# Patient Record
Sex: FEMALE | Race: WHITE | NOT HISPANIC OR LATINO | Employment: UNEMPLOYED | URBAN - METROPOLITAN AREA
[De-identification: names, ages, dates, MRNs, and addresses within clinical notes are randomized per-mention and may not be internally consistent; named-entity substitution may affect disease eponyms.]

---

## 2017-05-25 ENCOUNTER — OFFICE VISIT (OUTPATIENT)
Dept: ENDOCRINOLOGY | Age: 22
End: 2017-05-25

## 2017-05-25 VITALS
SYSTOLIC BLOOD PRESSURE: 114 MMHG | HEART RATE: 74 BPM | OXYGEN SATURATION: 98 % | BODY MASS INDEX: 21.97 KG/M2 | WEIGHT: 109 LBS | HEIGHT: 59 IN | DIASTOLIC BLOOD PRESSURE: 68 MMHG

## 2017-05-25 DIAGNOSIS — R63.5 ABNORMAL WEIGHT GAIN: ICD-10-CM

## 2017-05-25 DIAGNOSIS — E06.9 THYROIDITIS: ICD-10-CM

## 2017-05-25 DIAGNOSIS — R53.82 CHRONIC FATIGUE: ICD-10-CM

## 2017-05-25 DIAGNOSIS — E03.9 HYPOTHYROIDISM, UNSPECIFIED TYPE: Primary | ICD-10-CM

## 2017-05-25 PROCEDURE — 99205 OFFICE O/P NEW HI 60 MIN: CPT | Performed by: INTERNAL MEDICINE

## 2017-05-25 RX ORDER — OLANZAPINE 10 MG/1
TABLET ORAL
Refills: 0 | COMMUNITY
Start: 2017-05-23 | End: 2017-07-06

## 2017-05-25 RX ORDER — FLUOXETINE 20 MG/1
TABLET, FILM COATED ORAL
Refills: 0 | COMMUNITY
Start: 2017-05-23 | End: 2017-07-06

## 2017-05-25 RX ORDER — DIVALPROEX SODIUM 500 MG/1
TABLET, EXTENDED RELEASE ORAL
Refills: 0 | COMMUNITY
Start: 2017-05-17

## 2017-05-26 DIAGNOSIS — R53.82 CHRONIC FATIGUE: ICD-10-CM

## 2017-05-26 DIAGNOSIS — E03.9 HYPOTHYROIDISM, UNSPECIFIED TYPE: Primary | ICD-10-CM

## 2017-05-26 PROBLEM — E06.9 THYROIDITIS: Status: ACTIVE | Noted: 2017-05-26

## 2017-05-26 PROBLEM — R63.5 ABNORMAL WEIGHT GAIN: Status: ACTIVE | Noted: 2017-05-26

## 2017-05-26 LAB
T3 SERPL-MCNC: 85.3 NG/DL (ref 80–200)
T4 FREE SERPL-MCNC: 1.29 NG/DL (ref 0.93–1.7)
THYROPEROXIDASE AB SERPL-ACNC: 9 IU/ML (ref 0–34)
TSH SERPL DL<=0.005 MIU/L-ACNC: 1.78 MIU/ML (ref 0.27–4.2)

## 2017-05-26 RX ORDER — LIOTHYRONINE SODIUM 5 UG/1
5 TABLET ORAL
Qty: 30 TABLET | Refills: 1 | Status: SHIPPED | OUTPATIENT
Start: 2017-05-26 | End: 2017-07-17 | Stop reason: SDUPTHER

## 2017-06-07 DIAGNOSIS — E03.9 HYPOTHYROIDISM, UNSPECIFIED TYPE: Primary | ICD-10-CM

## 2017-06-07 DIAGNOSIS — R53.82 CHRONIC FATIGUE: ICD-10-CM

## 2017-06-12 ENCOUNTER — RESULTS ENCOUNTER (OUTPATIENT)
Dept: ENDOCRINOLOGY | Age: 22
End: 2017-06-12

## 2017-06-12 DIAGNOSIS — R53.82 CHRONIC FATIGUE: ICD-10-CM

## 2017-06-12 DIAGNOSIS — E03.9 HYPOTHYROIDISM, UNSPECIFIED TYPE: ICD-10-CM

## 2017-07-06 ENCOUNTER — OFFICE VISIT (OUTPATIENT)
Dept: ENDOCRINOLOGY | Age: 22
End: 2017-07-06

## 2017-07-06 VITALS
DIASTOLIC BLOOD PRESSURE: 78 MMHG | BODY MASS INDEX: 22.09 KG/M2 | HEIGHT: 59 IN | WEIGHT: 109.6 LBS | HEART RATE: 76 BPM | OXYGEN SATURATION: 98 % | SYSTOLIC BLOOD PRESSURE: 100 MMHG

## 2017-07-06 DIAGNOSIS — R53.82 CHRONIC FATIGUE: ICD-10-CM

## 2017-07-06 DIAGNOSIS — R63.5 ABNORMAL WEIGHT GAIN: ICD-10-CM

## 2017-07-06 DIAGNOSIS — E03.9 HYPOTHYROIDISM, UNSPECIFIED TYPE: Primary | ICD-10-CM

## 2017-07-06 DIAGNOSIS — E06.9 THYROIDITIS: ICD-10-CM

## 2017-07-06 LAB
T3 SERPL-MCNC: 73.1 NG/DL (ref 80–200)
T4 FREE SERPL-MCNC: 1.17 NG/DL (ref 0.93–1.7)
TSH SERPL DL<=0.005 MIU/L-ACNC: 4.95 MIU/ML (ref 0.27–4.2)

## 2017-07-06 PROCEDURE — 99214 OFFICE O/P EST MOD 30 MIN: CPT | Performed by: INTERNAL MEDICINE

## 2017-07-06 NOTE — PROGRESS NOTES
21 y.o.    Patient Care Team:  Chidi Brand MD as PCP - General (Family Medicine)    Chief Complaint:      F/U HYPOTHYROIDISM.    Subjective     HPI patient is a 21-year-old female of [place] origin came for follow-up of hypothyroidism    In the previous visit patient was placed on Cytomel for 1 month only    patient managed to discontinue the medication  She reports that she does not feel any different not taking the medication.  She's been feeling much better for the past one month  She reported that she stopped olanzapine recently  She gained nearly 16 pounds since January  Since stopping the medication olanzapine she's trending down on her weight  Patient started exercising.  She's feeling better in general and is going back to work.  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  Patient is currently not on medication/replacement     Patient reports symptoms of fatigue and excessive daytime sleepiness, cold intolerance, hair loss, irregular menstrual cycles and 8 pound weight gain in the past 2 months     Other significant history includes diagnoses of bipolar or less in January 2017 when she apparently was in a manic episode for one week subsequently several medications were tried and patient is currently on olanzapine and Depakote.  She used to be on Lamictal which was stopped approximately 1 week ago due to severe anxiety  Patient denies any difficulty swallowing or pain in the neck.  She also denies any change in her voice  Patient is  "not previously known to have any thyroid problem  The following portions of the patient's history were reviewed and updated as appropriate: allergies, current medications, past family history, past medical history, past social history, past surgical history and problem list.    Past Medical History:   Diagnosis Date   • Hypothyroidism      Family History   Problem Relation Age of Onset   • Diabetes Mother      Social History     Social History   • Marital status: Unknown     Spouse name: N/A   • Number of children: N/A   • Years of education: N/A     Occupational History   • Not on file.     Social History Main Topics   • Smoking status: Never Smoker   • Smokeless tobacco: Not on file   • Alcohol use No   • Drug use: No   • Sexual activity: Not on file     Other Topics Concern   • Not on file     Social History Narrative     No Known Allergies    Current Outpatient Prescriptions:   •  divalproex (DEPAKOTE) 500 MG 24 hr tablet, take 2 tablets by mouth twice a day, Disp: , Rfl: 0  •  FLUoxetine (PROzac) 20 MG tablet, take 1 tablet by mouth once daily, Disp: , Rfl: 0  •  liothyronine (CYTOMEL) 5 MCG tablet, Take 1 tablet by mouth Every Morning Before Breakfast., Disp: 30 tablet, Rfl: 1  •  OLANZapine (zyPREXA) 10 MG tablet, take 1 tablet by mouth at bedtime, Disp: , Rfl: 0        Review of Systems   Constitutional: Negative for chills, fatigue and fever.   Cardiovascular: Negative for chest pain and palpitations.   Gastrointestinal: Negative for abdominal pain, constipation, diarrhea, nausea and vomiting.   Endocrine: Negative for cold intolerance and heat intolerance.   All other systems reviewed and are negative.      Objective       Vitals:    07/06/17 1015   BP: 100/78   Pulse: 76   SpO2: 98%   Weight: 109 lb 9.6 oz (49.7 kg)   Height: 59\" (149.9 cm)     Body mass index is 22.14 kg/(m^2).      Physical Exam   Constitutional: She is oriented to person, place, and time. She appears well-developed and well-nourished. "   HENT:   Head: Normocephalic and atraumatic.   Eyes: EOM are normal. Pupils are equal, round, and reactive to light.   Neck: Normal range of motion. Neck supple.   Cardiovascular: Normal rate, regular rhythm, normal heart sounds and intact distal pulses.    Pulmonary/Chest: Effort normal and breath sounds normal.   Abdominal: Soft. Bowel sounds are normal. She exhibits no distension. There is no tenderness.   Musculoskeletal: Normal range of motion. She exhibits no edema.   Neurological: She is alert and oriented to person, place, and time. She has normal reflexes.   Skin: Skin is warm and dry. No rash noted.   Psychiatric: She has a normal mood and affect. Her behavior is normal.   Nursing note and vitals reviewed.    Results Review:     I reviewed the patient's new clinical results.    Medical records reviewed  Summary:      Office Visit on 05/25/2017   Component Date Value Ref Range Status   • Free T4 05/25/2017 1.29  0.93 - 1.70 ng/dL Final   • T3, Total 05/25/2017 85.3  80.0 - 200.0 ng/dL Final   • Thyroid Peroxidase Antibody 05/25/2017 9  0 - 34 IU/mL Final   • TSH 05/25/2017 1.780  0.270 - 4.200 mIU/mL Final     No results found for: HGBA1C  No results found for: GLUF, MICROALBUR, LDLCALC, CREATININE  Imaging Results (most recent)     None                Assessment and Plan:    Patricio was seen today for hypothyroidism.    Diagnoses and all orders for this visit:    Hypothyroidism, unspecified type  -     T3  -     T4, Free  -     TSH    Thyroiditis  -     T3  -     T4, Free  -     TSH    Chronic fatigue  -     T3  -     T4, Free  -     TSH    Abnormal weight gain  -     T3  -     T4, Free  -     TSH         Patient reported that she is feeling much better since stopping olanzapine  She's currently exercising.  Able to eat well and sleeping well  She is also working locally in [place] and she likes the job.  Her job is from 9 AM to 5 PM  Patient tried Cytomel as recommended for 1 month 5 µg daily in the  morning  She cannot recall if the Cytomel made her feel better but she had no side effects    She stopped taking Cytomel June 26, 2017.  Since then she has not seen any negative symptoms    she continues to feel better    Most likely she does not need this medication    I will get lab testing done today  Patient will be notified after the results have reviewed    If the lab reports are normal she may not need to follow-up with me other than when necessary     The total time spent for old record and lab review and face- to- face was more than 25 min of which greater than 15 min of time was spent on counseling the patient on recommended evaluation and treatment options, instructions for management/treatment and /or follow up  and importance of compliance with chosen management or treatment options  You Pulliam MD. FACE    07/06/17

## 2017-07-17 RX ORDER — LIOTHYRONINE SODIUM 5 UG/1
TABLET ORAL
Qty: 30 TABLET | Refills: 1 | Status: SHIPPED | OUTPATIENT
Start: 2017-07-17 | End: 2017-08-17

## 2017-08-17 ENCOUNTER — OFFICE VISIT (OUTPATIENT)
Dept: ENDOCRINOLOGY | Age: 22
End: 2017-08-17

## 2017-08-17 VITALS
WEIGHT: 110.4 LBS | SYSTOLIC BLOOD PRESSURE: 100 MMHG | OXYGEN SATURATION: 97 % | HEIGHT: 58 IN | BODY MASS INDEX: 23.18 KG/M2 | DIASTOLIC BLOOD PRESSURE: 62 MMHG | HEART RATE: 66 BPM

## 2017-08-17 DIAGNOSIS — E03.9 HYPOTHYROIDISM, UNSPECIFIED TYPE: Primary | ICD-10-CM

## 2017-08-17 DIAGNOSIS — E06.9 THYROIDITIS: ICD-10-CM

## 2017-08-17 DIAGNOSIS — R53.82 CHRONIC FATIGUE: ICD-10-CM

## 2017-08-17 PROCEDURE — 99214 OFFICE O/P EST MOD 30 MIN: CPT | Performed by: INTERNAL MEDICINE

## 2017-08-17 NOTE — PROGRESS NOTES
21 y.o.    Patient Care Team:  Chidi Brand MD as PCP - General (Family Medicine)    Chief Complaint:      F/U HYPOTHYROIDISM.  LAST LABS 7/6/17  Subjective     HPI     Patient is a 21-year-old female of  origin came for followup of hypothyroidism.  In the previous visit. Patient was placed on Cytomel for one month. Extent. She subsequently  discontinued. The medication. Patient reported that she did not make her feel any better.    She's currently taking Depakote from a psychiatrist.  She has not had any blood testing done recently.  Patient's weight is stable. At 110 pounds at a BMI of 23.1.  Patient started exercising on a regular basis.  She denies any cold or heat intolerance.          Interval History       In the previous visit patient was placed on Cytomel for 1 month only    patient managed to discontinue the medication  She reports that she does not feel any different not taking the medication.  She's been feeling much better for the past one month  She reported that she stopped olanzapine recently  She gained nearly 16 pounds since January  Since stopping the medication olanzapine she's trending down on her weight  Patient started exercising.  She's feeling better in general and is going back to work.  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  Patient is currently not on medication/replacement      Patient reports symptoms of fatigue and excessive daytime sleepiness, cold intolerance, hair loss, irregular menstrual  cycles and 8 pound weight gain in the past 2 months      Other significant history includes diagnoses of bipolar or less in January 2017 when she apparently was in a manic episode for one week subsequently several medications were tried and patient is currently on olanzapine and Depakote.  She used to be on Lamictal which was stopped approximately 1 week ago due to severe anxiety  Patient denies any difficulty swallowing or pain in the neck.  She also denies any change in her voice  Patient is not previously known to have any thyroid problem  The following portions of the patient's history were reviewed and updated as appropriate: allergies, current medications, past family history, past medical history, past social history, past surgical history and problem list.    Past Medical History:   Diagnosis Date   • Hypothyroidism      Family History   Problem Relation Age of Onset   • Diabetes Mother      Social History     Social History   • Marital status: Unknown     Spouse name: N/A   • Number of children: N/A   • Years of education: N/A     Occupational History   • Not on file.     Social History Main Topics   • Smoking status: Never Smoker   • Smokeless tobacco: Not on file   • Alcohol use No   • Drug use: No   • Sexual activity: Not on file     Other Topics Concern   • Not on file     Social History Narrative     No Known Allergies    Current Outpatient Prescriptions:   •  divalproex (DEPAKOTE) 500 MG 24 hr tablet, take 2 tablets by mouth twice a day, Disp: , Rfl: 0  •  Levonorgestrel 13.5 MG intrauterine device IUD, 13.5 mg by Intrauterine route., Disp: , Rfl:         Review of Systems   Constitutional: Positive for fatigue. Negative for chills and fever.   Cardiovascular: Negative for chest pain and palpitations.   Gastrointestinal: Negative for abdominal pain, constipation, diarrhea, nausea and vomiting.   Endocrine: Negative for cold intolerance and heat intolerance.   All other systems reviewed and are  "negative.      Objective       Vitals:    08/17/17 1113   BP: 100/62   Pulse: 66   SpO2: 97%   Weight: 110 lb 6.4 oz (50.1 kg)   Height: 58\" (147.3 cm)     Body mass index is 23.07 kg/(m^2).      Physical Exam   Constitutional: She is oriented to person, place, and time. She appears well-developed and well-nourished.   HENT:   Head: Normocephalic and atraumatic.   Eyes: EOM are normal. Pupils are equal, round, and reactive to light.   Neck: Normal range of motion. Neck supple.   Cardiovascular: Normal rate, regular rhythm, normal heart sounds and intact distal pulses.    Pulmonary/Chest: Effort normal and breath sounds normal.   Abdominal: Soft. Bowel sounds are normal. She exhibits no distension. There is no tenderness.   Musculoskeletal: Normal range of motion. She exhibits no edema.   Neurological: She is alert and oriented to person, place, and time. She has normal reflexes.   Skin: Skin is warm and dry. No rash noted.   Psychiatric: She has a normal mood and affect. Her behavior is normal.   Nursing note and vitals reviewed.    Results Review:     I reviewed the patient's new clinical results.    Medical records reviewed  Summary:      Office Visit on 07/06/2017   Component Date Value Ref Range Status   • T3, Total 07/06/2017 73.1* 80.0 - 200.0 ng/dL Final   • Free T4 07/06/2017 1.17  0.93 - 1.70 ng/dL Final   • TSH 07/06/2017 4.950* 0.270 - 4.200 mIU/mL Final     No results found for: HGBA1C  No results found for: GLUF, MICROALBUR, LDLCALC, CREATININE  Imaging Results (most recent)     None          Assessment and Plan:    Patricio was seen today for hypothyroidism.    Diagnoses and all orders for this visit:    Hypothyroidism, unspecified type  -     T4, Free  -     TSH  -     T3  -     Valproic Acid Level, Total  -     Comprehensive Metabolic Panel  -     CBC & Differential    Thyroiditis  -     T4, Free  -     TSH  -     T3  -     Valproic Acid Level, Total  -     Comprehensive Metabolic Panel  -     CBC & " "Differential    Chronic fatigue  -     T4, Free  -     TSH  -     T3  -     Valproic Acid Level, Total  -     Comprehensive Metabolic Panel  -     CBC & Differential    Patient reports feeling slightly better without the medication.  She stopped taking Cytomel almost 6 weeks ago.  She is working now and is able to continue her work without feeling excessively fatigued.  Patient is also not clear in her Depakote is causing the fatigue.  She's taking a rather high dose of 500 mg  4 tablets daily  She wants the Depakote levels checked.  She will also get CBC and CMP.  I will also check T3, free T4 and TSH levels today.    Patient's last TSH was slightly elevated at 4.8.  If the patient's thyroid levels are within the normal range. Then she may not need any further treatment.  Patient verbalized understanding.    No followup planned at this time    You Pulliam MD. FACE    08/17/17      JULIO CESAR Haley / transcription disclaimer:     \"Dictated utilizing Dragon dictation\".         "

## 2017-08-18 LAB
ALBUMIN SERPL-MCNC: 3.8 G/DL (ref 3.5–5.5)
ALBUMIN/GLOB SERPL: 1.7 {RATIO} (ref 1.2–2.2)
ALP SERPL-CCNC: 42 IU/L (ref 39–117)
ALT SERPL-CCNC: 45 IU/L (ref 0–32)
AST SERPL-CCNC: 41 IU/L (ref 0–40)
BASOPHILS # BLD AUTO: 0 X10E3/UL (ref 0–0.2)
BASOPHILS NFR BLD AUTO: 1 %
BILIRUB SERPL-MCNC: 0.4 MG/DL (ref 0–1.2)
BUN SERPL-MCNC: 14 MG/DL (ref 6–20)
BUN/CREAT SERPL: 19 (ref 9–23)
CALCIUM SERPL-MCNC: 9 MG/DL (ref 8.7–10.2)
CHLORIDE SERPL-SCNC: 100 MMOL/L (ref 96–106)
CO2 SERPL-SCNC: 25 MMOL/L (ref 18–29)
CREAT SERPL-MCNC: 0.72 MG/DL (ref 0.57–1)
EOSINOPHIL # BLD AUTO: 0 X10E3/UL (ref 0–0.4)
EOSINOPHIL NFR BLD AUTO: 0 %
ERYTHROCYTE [DISTWIDTH] IN BLOOD BY AUTOMATED COUNT: 16 % (ref 12.3–15.4)
GLOBULIN SER CALC-MCNC: 2.2 G/DL (ref 1.5–4.5)
GLUCOSE SERPL-MCNC: 80 MG/DL (ref 65–99)
HCT VFR BLD AUTO: 39.4 % (ref 34–46.6)
HGB BLD-MCNC: 13.4 G/DL (ref 11.1–15.9)
IMM GRANULOCYTES # BLD: 0 X10E3/UL (ref 0–0.1)
IMM GRANULOCYTES NFR BLD: 0 %
LYMPHOCYTES # BLD AUTO: 1.7 X10E3/UL (ref 0.7–3.1)
LYMPHOCYTES NFR BLD AUTO: 45 %
MCH RBC QN AUTO: 29.9 PG (ref 26.6–33)
MCHC RBC AUTO-ENTMCNC: 34 G/DL (ref 31.5–35.7)
MCV RBC AUTO: 88 FL (ref 79–97)
MONOCYTES # BLD AUTO: 0.5 X10E3/UL (ref 0.1–0.9)
MONOCYTES NFR BLD AUTO: 14 %
MORPHOLOGY BLD-IMP: (no result)
NEUTROPHILS # BLD AUTO: 1.5 X10E3/UL (ref 1.4–7)
NEUTROPHILS NFR BLD AUTO: 40 %
PLATELET # BLD AUTO: 92 X10E3/UL (ref 150–379)
POTASSIUM SERPL-SCNC: 4.4 MMOL/L (ref 3.5–5.2)
PROT SERPL-MCNC: 6 G/DL (ref 6–8.5)
RBC # BLD AUTO: 4.48 X10E6/UL (ref 3.77–5.28)
SODIUM SERPL-SCNC: 139 MMOL/L (ref 134–144)
T3 SERPL-MCNC: 67 NG/DL (ref 71–180)
T4 FREE SERPL-MCNC: 1.18 NG/DL (ref 0.82–1.77)
TSH SERPL DL<=0.005 MIU/L-ACNC: 5.04 UIU/ML (ref 0.45–4.5)
VALPROATE SERPL-MCNC: 143 UG/ML (ref 50–100)
WBC # BLD AUTO: 3.7 X10E3/UL (ref 3.4–10.8)

## 2017-08-29 RX ORDER — LEVOTHYROXINE SODIUM 0.03 MG/1
25 TABLET ORAL DAILY
Qty: 30 TABLET | Refills: 4 | Status: SHIPPED | OUTPATIENT
Start: 2017-08-29 | End: 2018-01-04 | Stop reason: SDUPTHER

## 2017-11-10 ENCOUNTER — OFFICE VISIT (OUTPATIENT)
Dept: ENDOCRINOLOGY | Age: 22
End: 2017-11-10

## 2017-11-10 VITALS
HEART RATE: 76 BPM | BODY MASS INDEX: 21.57 KG/M2 | HEIGHT: 59 IN | WEIGHT: 107 LBS | SYSTOLIC BLOOD PRESSURE: 106 MMHG | DIASTOLIC BLOOD PRESSURE: 64 MMHG

## 2017-11-10 DIAGNOSIS — R63.5 ABNORMAL WEIGHT GAIN: ICD-10-CM

## 2017-11-10 DIAGNOSIS — R53.82 CHRONIC FATIGUE: ICD-10-CM

## 2017-11-10 DIAGNOSIS — E03.9 HYPOTHYROIDISM, UNSPECIFIED TYPE: Primary | ICD-10-CM

## 2017-11-10 DIAGNOSIS — E06.9 THYROIDITIS: ICD-10-CM

## 2017-11-10 PROCEDURE — 99214 OFFICE O/P EST MOD 30 MIN: CPT | Performed by: INTERNAL MEDICINE

## 2017-11-10 RX ORDER — TOPIRAMATE 50 MG/1
TABLET, FILM COATED ORAL
Refills: 0 | COMMUNITY
Start: 2017-10-16 | End: 2018-05-18

## 2017-11-10 NOTE — PROGRESS NOTES
21 y.o.    Patient Care Team:  Chidi Brand MD as PCP - General (Family Medicine)    Chief Complaint:      F/U HYPOTHYROIDISM.  NO RECENT LABS.  Subjective     HPI   Patient is a 21-year-old female of [place] origin came for follow-up of hypothyroidism  After the lab tests in the previous visit patient was advised to start levothyroxine 25 µg daily.  She reported compliance with the medication since then  She denied any side effects  No palpitations or sweating as reported with Cytomel in the past  She is feeling slightly better but believes that her psychiatric medication is changed  She is taking lower dose of Depakote and apparently the plan is to discontinue it at some point in the near future  She continues to be on birth control medication    Patient also lost a few pounds since last visit and she believes that from exercise and diet control    Interval History      In the previous visit. Patient was placed on Cytomel for one month. Extent. She subsequently  discontinued. The medication. Patient reported that she did not make her feel any better.     She's currently taking Depakote from a psychiatrist.  She has not had any blood testing done recently.  Patient's weight is stable. At 110 pounds at a BMI of 23.1.  Patient started exercising on a regular basis.  She denies any cold or heat intolerance.      Interval History       In the previous visit patient was placed on Cytomel for 1 month only    patient managed to discontinue the medication  She reports that she does not feel any different not taking the medication.  She's been feeling much better for the past one month  She reported that she stopped olanzapine recently  She gained nearly 16 pounds since January  Since stopping the medication olanzapine she's trending down on her weight  Patient started exercising.  She's feeling better in general and is going back to work.  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  Patient is currently not on medication/replacement      Patient reports symptoms of fatigue and excessive daytime sleepiness, cold intolerance, hair loss, irregular menstrual cycles and 8 pound weight gain in the past 2 months      Other significant history includes diagnoses of bipolar or less in January 2017 when she apparently was in a manic episode for one week subsequently several medications were tried and patient is currently on olanzapine and Depakote.  She used to be on Lamictal which was stopped approximately 1 week ago due to severe anxiety  Patient denies any difficulty swallowing or pain in the neck.  She also denies any change in her voice  Patient is not previously known to have any thyroid problem  The following portions of the patient's history were reviewed and updated as appropriate: allergies, current medications, past family history, past medical history, past social history, past surgical history and problem list.    Past Medical History:   Diagnosis Date   • Hypothyroidism      Family History   Problem Relation Age of Onset   • Diabetes Mother      Social History     Social History   • Marital status: Unknown     Spouse name: N/A   • Number of children: N/A   • Years of education: N/A     Occupational History   • Not on file.     Social History Main Topics   • Smoking status: Never Smoker   • Smokeless tobacco: Not on file   • Alcohol use No   • Drug use: No   • Sexual activity: Not on file     Other Topics Concern   • Not on file     Social History  "Narrative     No Known Allergies    Current Outpatient Prescriptions:   •  divalproex (DEPAKOTE) 500 MG 24 hr tablet, take 2 tablets by mouth twice a day, Disp: , Rfl: 0  •  Levonorgestrel 13.5 MG intrauterine device IUD, 13.5 mg by Intrauterine route., Disp: , Rfl:   •  levothyroxine (SYNTHROID, LEVOTHROID) 25 MCG tablet, Take 1 tablet by mouth Daily., Disp: 30 tablet, Rfl: 4        Review of Systems   Constitutional: Negative for chills, fatigue and fever.   Cardiovascular: Negative for chest pain and palpitations.   Gastrointestinal: Negative for abdominal pain, constipation, diarrhea, nausea and vomiting.   Endocrine: Negative for cold intolerance and heat intolerance.   All other systems reviewed and are negative.      Objective       Vitals:    11/10/17 1524   BP: 106/64   Pulse: 76   Weight: 107 lb (48.5 kg)   Height: 59\" (149.9 cm)     Body mass index is 21.61 kg/(m^2).      Physical Exam   Constitutional: She is oriented to person, place, and time. She appears well-developed and well-nourished.   HENT:   Head: Normocephalic and atraumatic.   Eyes: EOM are normal. Pupils are equal, round, and reactive to light.   Neck: Normal range of motion. Neck supple.   Cardiovascular: Normal rate, regular rhythm, normal heart sounds and intact distal pulses.    Pulmonary/Chest: Effort normal and breath sounds normal.   Abdominal: Soft. Bowel sounds are normal. She exhibits no distension. There is no tenderness.   Musculoskeletal: Normal range of motion. She exhibits no edema.   Neurological: She is alert and oriented to person, place, and time. She has normal reflexes.   Skin: Skin is warm and dry. No rash noted.   Psychiatric: She has a normal mood and affect. Her behavior is normal.   Nursing note and vitals reviewed.    Results Review:     I reviewed the patient's new clinical results.    Medical records reviewed  Summary:      Office Visit on 08/17/2017   Component Date Value Ref Range Status   • Free T4 08/17/2017 " 1.18  0.82 - 1.77 ng/dL Final   • TSH 08/17/2017 5.040* 0.450 - 4.500 uIU/mL Final   • T3, Total 08/17/2017 67* 71 - 180 ng/dL Final   • Valproic Acid 08/17/2017 143* 50 - 100 ug/mL Final    Comment:                                 Detection Limit = 4                             <4 indicates None Detected  Toxicity may occur at levels of 100-500. Measurements  of free unbound valproic acid may improve the assess-  ment of clinical response.  Patient drug level exceeds published reference range.  Evaluate  clinically for signs of potential toxicity.     • Glucose 08/17/2017 80  65 - 99 mg/dL Final   • BUN 08/17/2017 14  6 - 20 mg/dL Final   • Creatinine 08/17/2017 0.72  0.57 - 1.00 mg/dL Final   • eGFR Non  Am 08/17/2017 118  >59 mL/min/1.73 Final   • eGFR African Am 08/17/2017 137  >59 mL/min/1.73 Final   • BUN/Creatinine Ratio 08/17/2017 19  9 - 23 Final   • Sodium 08/17/2017 139  134 - 144 mmol/L Final   • Potassium 08/17/2017 4.4  3.5 - 5.2 mmol/L Final   • Chloride 08/17/2017 100  96 - 106 mmol/L Final   • Total CO2 08/17/2017 25  18 - 29 mmol/L Final   • Calcium 08/17/2017 9.0  8.7 - 10.2 mg/dL Final   • Total Protein 08/17/2017 6.0  6.0 - 8.5 g/dL Final   • Albumin 08/17/2017 3.8  3.5 - 5.5 g/dL Final   • Globulin 08/17/2017 2.2  1.5 - 4.5 g/dL Final   • A/G Ratio 08/17/2017 1.7  1.2 - 2.2 Final   • Total Bilirubin 08/17/2017 0.4  0.0 - 1.2 mg/dL Final   • Alkaline Phosphatase 08/17/2017 42  39 - 117 IU/L Final   • AST (SGOT) 08/17/2017 41* 0 - 40 IU/L Final   • ALT (SGPT) 08/17/2017 45* 0 - 32 IU/L Final   • WBC 08/17/2017 3.7  3.4 - 10.8 x10E3/uL Final   • RBC 08/17/2017 4.48  3.77 - 5.28 x10E6/uL Final   • Hemoglobin 08/17/2017 13.4  11.1 - 15.9 g/dL Final   • Hematocrit 08/17/2017 39.4  34.0 - 46.6 % Final   • MCV 08/17/2017 88  79 - 97 fL Final   • MCH 08/17/2017 29.9  26.6 - 33.0 pg Final   • MCHC 08/17/2017 34.0  31.5 - 35.7 g/dL Final   • RDW 08/17/2017 16.0* 12.3 - 15.4 % Final   • Platelets  08/17/2017 92* 150 - 379 x10E3/uL Final    Platelet count verified by examination of peripheral blood smear.   • Neutrophil Rel % 08/17/2017 40  % Final   • Lymphocyte Rel % 08/17/2017 45  % Final   • Monocyte Rel % 08/17/2017 14  % Final   • Eosinophil Rel % 08/17/2017 0  % Final   • Basophil Rel % 08/17/2017 1  % Final   • Neutrophils Absolute 08/17/2017 1.5  1.4 - 7.0 x10E3/uL Final   • Lymphocytes Absolute 08/17/2017 1.7  0.7 - 3.1 x10E3/uL Final   • Monocytes Absolute 08/17/2017 0.5  0.1 - 0.9 x10E3/uL Final   • Eosinophils Absolute 08/17/2017 0.0  0.0 - 0.4 x10E3/uL Final   • Basophils Absolute 08/17/2017 0.0  0.0 - 0.2 x10E3/uL Final   • Immature Granulocyte Rel % 08/17/2017 0  % Final   • Immature Grans Absolute 08/17/2017 0.0  0.0 - 0.1 x10E3/uL Final   • Hematology Comments: 08/17/2017 Note:   Final    Verified by microscopic examination.     No results found for: HGBA1C  Lab Results   Component Value Date    CREATININE 0.72 08/17/2017     Imaging Results (most recent)     None                Assessment and Plan:    Patricio was seen today for hypothyroidism.    Diagnoses and all orders for this visit:    Hypothyroidism, unspecified type  -     Comprehensive Metabolic Panel  -     T4, Free  -     TSH  -     T3    Thyroiditis  -     T3    Abnormal weight gain  -     T3    Chronic fatigue  -     T3    Patient reports that she is feeling slightly better  She is continuing the 25 µg levothyroxine daily.  She is compliant with the medication  She reports having loss of hair    I recommended trying biotin over-the-counter  This may complicate the thyroid labs but she will try anyway  Patient will return to follow-up in 4-6 months    The total time spent for old record and lab review and face- to- face was more than 25 min of which greater than 15 min of time was spent on counseling the patient on recommended evaluation and treatment options, instructions for management/treatment and /or follow up  and importance of  "compliance with chosen management or treatment options  You Pulliam MD. FACE    11/10/17      EMR Dragon / transcription disclaimer:     \"Dictated utilizing Dragon dictation\".         "

## 2017-11-11 LAB
ALBUMIN SERPL-MCNC: 4.6 G/DL (ref 3.5–5.2)
ALBUMIN/GLOB SERPL: 2 G/DL
ALP SERPL-CCNC: 44 U/L (ref 39–117)
ALT SERPL-CCNC: 21 U/L (ref 1–33)
AST SERPL-CCNC: 28 U/L (ref 1–32)
BILIRUB SERPL-MCNC: 0.2 MG/DL (ref 0.1–1.2)
BUN SERPL-MCNC: 12 MG/DL (ref 6–20)
BUN/CREAT SERPL: 14 (ref 7–25)
CALCIUM SERPL-MCNC: 9.5 MG/DL (ref 8.6–10.5)
CHLORIDE SERPL-SCNC: 105 MMOL/L (ref 98–107)
CO2 SERPL-SCNC: 26.6 MMOL/L (ref 22–29)
CREAT SERPL-MCNC: 0.86 MG/DL (ref 0.57–1)
GFR SERPLBLD CREATININE-BSD FMLA CKD-EPI: 82 ML/MIN/1.73
GFR SERPLBLD CREATININE-BSD FMLA CKD-EPI: 99 ML/MIN/1.73
GLOBULIN SER CALC-MCNC: 2.3 GM/DL
GLUCOSE SERPL-MCNC: 114 MG/DL (ref 65–99)
POTASSIUM SERPL-SCNC: 3.7 MMOL/L (ref 3.5–5.2)
PROT SERPL-MCNC: 6.9 G/DL (ref 6–8.5)
SODIUM SERPL-SCNC: 143 MMOL/L (ref 136–145)
T4 FREE SERPL-MCNC: 1.62 NG/DL (ref 0.93–1.7)
TSH SERPL DL<=0.005 MIU/L-ACNC: 2.45 MIU/ML (ref 0.27–4.2)

## 2018-01-04 RX ORDER — LEVOTHYROXINE SODIUM 0.03 MG/1
25 TABLET ORAL DAILY
Qty: 30 TABLET | Refills: 4 | Status: SHIPPED | OUTPATIENT
Start: 2018-01-04 | End: 2018-01-10 | Stop reason: SDUPTHER

## 2018-01-04 RX ORDER — LEVOTHYROXINE SODIUM 0.03 MG/1
TABLET ORAL
Qty: 30 TABLET | Refills: 4 | Status: CANCELLED | OUTPATIENT
Start: 2018-01-04

## 2018-01-10 RX ORDER — LEVOTHYROXINE SODIUM 0.03 MG/1
25 TABLET ORAL DAILY
Qty: 30 TABLET | Refills: 4 | Status: SHIPPED | OUTPATIENT
Start: 2018-01-10 | End: 2018-06-11

## 2018-05-18 ENCOUNTER — OFFICE VISIT (OUTPATIENT)
Dept: ENDOCRINOLOGY | Age: 23
End: 2018-05-18

## 2018-05-18 VITALS
SYSTOLIC BLOOD PRESSURE: 110 MMHG | DIASTOLIC BLOOD PRESSURE: 68 MMHG | HEART RATE: 72 BPM | HEIGHT: 59 IN | BODY MASS INDEX: 21.57 KG/M2 | WEIGHT: 107 LBS

## 2018-05-18 DIAGNOSIS — E06.9 THYROIDITIS: ICD-10-CM

## 2018-05-18 DIAGNOSIS — E03.9 HYPOTHYROIDISM, UNSPECIFIED TYPE: Primary | ICD-10-CM

## 2018-05-18 DIAGNOSIS — R53.82 CHRONIC FATIGUE: ICD-10-CM

## 2018-05-18 PROCEDURE — 99214 OFFICE O/P EST MOD 30 MIN: CPT | Performed by: INTERNAL MEDICINE

## 2018-05-18 RX ORDER — ERGOCALCIFEROL 1.25 MG/1
50000 CAPSULE ORAL
COMMUNITY
Start: 2018-02-15 | End: 2018-12-20

## 2018-05-18 RX ORDER — ZIPRASIDONE HYDROCHLORIDE 20 MG/1
CAPSULE ORAL
COMMUNITY
Start: 2018-02-23 | End: 2018-12-20 | Stop reason: DRUGHIGH

## 2018-05-18 RX ORDER — FLUOXETINE 10 MG/1
CAPSULE ORAL
COMMUNITY
Start: 2018-01-31 | End: 2018-12-20 | Stop reason: DRUGHIGH

## 2018-05-18 NOTE — PROGRESS NOTES
22 y.o.    Patient Care Team:  Chidi Brand MD as PCP - General (Family Medicine)    Chief Complaint:        F/U HYPOTHYROIDISM.  NO RECENT LABS.    Subjective     HPI   Patient is a 22-year-old female of [place] origin came for follow-up of hypothyroidism  She was prescribed levothyroxine 25 µg daily.  She reported significant noncompliance  In the past several months she may have taken once or twice a week only  She has stressors Cytomel in the past but reported significant side effects and were discontinued  Patient is currently taking the Geodon and Prozac and Depakote  For bipolar illness  She reports that she is actually feeling better and is considering cutting back on medication  She is also going for post graduation at [employer] in [place] for one year  She is looking forward to that as well as planning for her PhD after that     Interval History        In the previous visit. Patient was placed on Cytomel for one month. Extent. She subsequently  discontinued. The medication. Patient reported that she did not make her feel any better.     She's currently taking Depakote from a psychiatrist.  She has not had any blood testing done recently.  Patient's weight is stable. At 110 pounds at a BMI of 23.1.  Patient started exercising on a regular basis.  She denies any cold or heat intolerance.      Interval History       In the previous visit patient was placed on Cytomel for 1 month only    patient managed to discontinue the medication  She reports that she does not feel any different not taking the medication.  She's been feeling much better for the past one month  She reported that she stopped olanzapine recently  She gained nearly 16 pounds since January  Since stopping the medication olanzapine she's trending down on her weight  Patient started exercising.  She's feeling better in general and is going back to work.  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  Patient is currently not on medication/replacement      Patient reports symptoms of fatigue and excessive daytime sleepiness, cold intolerance, hair loss, irregular menstrual cycles and 8 pound weight gain in the past 2 months      Other significant history includes diagnoses of bipolar or less in January 2017 when she apparently was in a manic episode for one week subsequently several medications were tried and patient is currently on olanzapine and Depakote.  She used to be on Lamictal which was stopped approximately 1 week ago due to severe anxiety  Patient denies any difficulty swallowing or pain in the neck.  She also denies any change in her voice  Patient is not previously known to have any thyroid problem  The following portions of the patient's history were reviewed and updated as appropriate: allergies, current medications, past family history, past medical history, past social history, past surgical history and problem list.    Past Medical History:   Diagnosis Date   • Hypothyroidism      Family History   Problem Relation Age of Onset   • Diabetes Mother      Social History     Social History   • Marital status: Unknown     Spouse name: N/A   • Number of children: N/A   • Years of education: N/A     Occupational History   • Not on file.     Social History Main Topics   • Smoking status: Never Smoker   • Smokeless tobacco: Never Used   • Alcohol use No   • Drug use: No   • Sexual activity: Not on file     Other Topics Concern   • Not on file     Social  "History Narrative   • No narrative on file     No Known Allergies    Current Outpatient Prescriptions:   •  divalproex (DEPAKOTE) 500 MG 24 hr tablet, take 2 tablets by mouth ONCE a day, Disp: , Rfl: 0  •  Levonorgestrel 13.5 MG intrauterine device IUD, 13.5 mg by Intrauterine route., Disp: , Rfl:   •  levothyroxine (SYNTHROID, LEVOTHROID) 25 MCG tablet, Take 1 tablet by mouth Daily., Disp: 30 tablet, Rfl: 4  •  topiramate (TOPAMAX) 50 MG tablet, , Disp: , Rfl: 0        Review of Systems   Constitutional: Negative for chills, fatigue and fever.   Cardiovascular: Negative for chest pain and palpitations.   Gastrointestinal: Negative for abdominal pain, constipation, diarrhea, nausea and vomiting.   Endocrine: Negative for cold intolerance and heat intolerance.   All other systems reviewed and are negative.      Objective       Vitals:    05/18/18 1458   BP: 110/68   Pulse: 72   Weight: 48.5 kg (107 lb)   Height: 149.9 cm (59\")     Body mass index is 21.61 kg/m².      Physical Exam   Constitutional: She is oriented to person, place, and time. She appears well-developed and well-nourished.   HENT:   Head: Normocephalic and atraumatic.   Eyes: EOM are normal. Pupils are equal, round, and reactive to light.   Neck: Normal range of motion. Neck supple.   Cardiovascular: Normal rate, regular rhythm, normal heart sounds and intact distal pulses.    Pulmonary/Chest: Effort normal and breath sounds normal.   Abdominal: Soft. Bowel sounds are normal. She exhibits no distension. There is no tenderness.   Musculoskeletal: Normal range of motion. She exhibits no edema.   Neurological: She is alert and oriented to person, place, and time. She has normal reflexes.   Skin: Skin is warm and dry. No rash noted.   Psychiatric: She has a normal mood and affect. Her behavior is normal.   Nursing note and vitals reviewed.    Results Review:     I reviewed the patient's new clinical results.    Medical records " reviewed  Summary:      Office Visit on 11/10/2017   Component Date Value Ref Range Status   • Glucose 11/10/2017 114* 65 - 99 mg/dL Final   • BUN 11/10/2017 12  6 - 20 mg/dL Final   • Creatinine 11/10/2017 0.86  0.57 - 1.00 mg/dL Final   • eGFR Non  Am 11/10/2017 82  >60 mL/min/1.73 Final   • eGFR African Am 11/10/2017 99  >60 mL/min/1.73 Final   • BUN/Creatinine Ratio 11/10/2017 14.0  7.0 - 25.0 Final   • Sodium 11/10/2017 143  136 - 145 mmol/L Final   • Potassium 11/10/2017 3.7  3.5 - 5.2 mmol/L Final   • Chloride 11/10/2017 105  98 - 107 mmol/L Final   • Total CO2 11/10/2017 26.6  22.0 - 29.0 mmol/L Final   • Calcium 11/10/2017 9.5  8.6 - 10.5 mg/dL Final   • Total Protein 11/10/2017 6.9  6.0 - 8.5 g/dL Final   • Albumin 11/10/2017 4.60  3.50 - 5.20 g/dL Final   • Globulin 11/10/2017 2.3  gm/dL Final   • A/G Ratio 11/10/2017 2.0  g/dL Final   • Total Bilirubin 11/10/2017 0.2  0.1 - 1.2 mg/dL Final   • Alkaline Phosphatase 11/10/2017 44  39 - 117 U/L Final   • AST (SGOT) 11/10/2017 28  1 - 32 U/L Final   • ALT (SGPT) 11/10/2017 21  1 - 33 U/L Final   • Free T4 11/10/2017 1.62  0.93 - 1.70 ng/dL Final   • TSH 11/10/2017 2.450  0.270 - 4.200 mIU/mL Final     No results found for: HGBA1C  Lab Results   Component Value Date    CREATININE 0.86 11/10/2017     Imaging Results (most recent)     None                Assessment and Plan:    Patricio was seen today for hypothyroidism.    Diagnoses and all orders for this visit:    Hypothyroidism, unspecified type  -     Comprehensive Metabolic Panel  -     T4, Free  -     TSH  -     T3  -     Vitamin D 25 Hydroxy    Thyroiditis    Chronic fatigue    Patient reported she's been very noncompliant with her levothyroxine  She may be taking once or twice a week for the past several months  She is feeling significantly better  She denies any symptoms suggestive of hypothyroidism  Patient will get lab testing done today    if the thyroid levels are stable than I may discontinue  "the levothyroxine since very noncompliant and may not need it    Advised the patient to come for follow-up in 6 months if needed  You Pulliam MD. FACE    05/18/18      EMR Dragon / transcription disclaimer:     \"Dictated utilizing Dragon dictation\".         "

## 2018-05-19 LAB
25(OH)D3+25(OH)D2 SERPL-MCNC: 43.8 NG/ML (ref 30–100)
ALBUMIN SERPL-MCNC: 4.4 G/DL (ref 3.5–5.2)
ALBUMIN/GLOB SERPL: 1.9 G/DL
ALP SERPL-CCNC: 39 U/L (ref 39–117)
ALT SERPL-CCNC: 10 U/L (ref 1–33)
AST SERPL-CCNC: 12 U/L (ref 1–32)
BILIRUB SERPL-MCNC: 0.4 MG/DL (ref 0.1–1.2)
BUN SERPL-MCNC: 10 MG/DL (ref 6–20)
BUN/CREAT SERPL: 15.4 (ref 7–25)
CALCIUM SERPL-MCNC: 9.5 MG/DL (ref 8.6–10.5)
CHLORIDE SERPL-SCNC: 103 MMOL/L (ref 98–107)
CO2 SERPL-SCNC: 26.6 MMOL/L (ref 22–29)
CREAT SERPL-MCNC: 0.65 MG/DL (ref 0.57–1)
GFR SERPLBLD CREATININE-BSD FMLA CKD-EPI: 112 ML/MIN/1.73
GFR SERPLBLD CREATININE-BSD FMLA CKD-EPI: 136 ML/MIN/1.73
GLOBULIN SER CALC-MCNC: 2.3 GM/DL
GLUCOSE SERPL-MCNC: 89 MG/DL (ref 65–99)
POTASSIUM SERPL-SCNC: 4.1 MMOL/L (ref 3.5–5.2)
PROT SERPL-MCNC: 6.7 G/DL (ref 6–8.5)
SODIUM SERPL-SCNC: 142 MMOL/L (ref 136–145)
T3 SERPL-MCNC: 80.8 NG/DL (ref 80–200)
T4 FREE SERPL-MCNC: 1.42 NG/DL (ref 0.93–1.7)
TSH SERPL DL<=0.005 MIU/L-ACNC: 2.69 MIU/ML (ref 0.27–4.2)

## 2018-08-07 ENCOUNTER — TELEPHONE (OUTPATIENT)
Dept: ENDOCRINOLOGY | Age: 23
End: 2018-08-07

## 2018-08-07 DIAGNOSIS — E03.9 HYPOTHYROIDISM, UNSPECIFIED TYPE: Primary | ICD-10-CM

## 2018-08-07 NOTE — TELEPHONE ENCOUNTER
----- Message from Vandana Raymond sent at 8/7/2018  8:45 AM EDT -----  Contact: MOTHER MAK AT CHECK OUT  I SPOKE TO MOTHER MAK AVALOS  AND SCHEDULED LAB APPT 8-20-18, 8:50AM AND READ HER YOUR MESSAGE. WILL YOU PLEASE ENTER LAB ORDER? THANK YOU.  ----- Message -----  From: Mariela Hidalgo MA  Sent: 8/6/2018   4:47 PM  To: Vandana Raymond    PATIENT OK TO JUST HAVE LABS DONE PRIOR TO [place]. SHE CAN GET APPOINTMENT WITH DR. LORA WHEN SHE RETURNS FROM [place].  ----- Message -----  From: Vandana Raymond  Sent: 7/25/2018  12:48 PM  To: Mariela Hidalgo MA    MOTHER REQUESTED TO HAVE LAB APPT SCHEDULED FOR 8-20-18 AND APPT TO SEE DR. LORA BY 9-5-18. SHE SAID PATIENT IS LEAVING FOR [place] ON 9-6-18.\    SHE SAID IF DR. LORA ONLY WANTS PATIENT TO HAVE LABS SHE IS OK WITH THAT.\    I ADVISED HER THAT WHEN PATIENT WAS LAST SEEN ON 5-18-18 THAT DR. LORA REQUESTED TO SEE HER IN 6 MONTHS AROUND 11-18-18 AND PATIENT IS CURRENTLY SCHEDULED ON 12-20-18, 3:00PM.  MOTHER REQUESTED THAT WE CALL HER. [phone]    LABS ORDERED

## 2018-08-20 ENCOUNTER — RESULTS ENCOUNTER (OUTPATIENT)
Dept: ENDOCRINOLOGY | Age: 23
End: 2018-08-20

## 2018-08-20 DIAGNOSIS — E03.9 HYPOTHYROIDISM, UNSPECIFIED TYPE: ICD-10-CM

## 2018-08-27 LAB
25(OH)D3+25(OH)D2 SERPL-MCNC: 24 NG/ML (ref 30–100)
ALBUMIN SERPL-MCNC: 4.5 G/DL (ref 3.5–5.2)
ALBUMIN/GLOB SERPL: 2.1 G/DL
ALP SERPL-CCNC: 36 U/L (ref 39–117)
ALT SERPL-CCNC: 10 U/L (ref 1–33)
AST SERPL-CCNC: 14 U/L (ref 1–32)
BILIRUB SERPL-MCNC: 0.6 MG/DL (ref 0.1–1.2)
BUN SERPL-MCNC: 10 MG/DL (ref 6–20)
BUN/CREAT SERPL: 11.8 (ref 7–25)
CALCIUM SERPL-MCNC: 9.1 MG/DL (ref 8.6–10.5)
CHLORIDE SERPL-SCNC: 106 MMOL/L (ref 98–107)
CO2 SERPL-SCNC: 27.6 MMOL/L (ref 22–29)
CREAT SERPL-MCNC: 0.85 MG/DL (ref 0.57–1)
GLOBULIN SER CALC-MCNC: 2.1 GM/DL
GLUCOSE SERPL-MCNC: 84 MG/DL (ref 65–99)
POTASSIUM SERPL-SCNC: 3.8 MMOL/L (ref 3.5–5.2)
PROT SERPL-MCNC: 6.6 G/DL (ref 6–8.5)
SODIUM SERPL-SCNC: 143 MMOL/L (ref 136–145)
T3 SERPL-MCNC: 80.2 NG/DL (ref 80–200)
T4 FREE SERPL-MCNC: 1.41 NG/DL (ref 0.93–1.7)
TSH SERPL DL<=0.005 MIU/L-ACNC: 6.37 MIU/ML (ref 0.27–4.2)

## 2018-12-19 ENCOUNTER — LAB (OUTPATIENT)
Dept: ENDOCRINOLOGY | Age: 23
End: 2018-12-19

## 2018-12-19 DIAGNOSIS — E03.9 HYPOTHYROIDISM, UNSPECIFIED TYPE: ICD-10-CM

## 2018-12-19 DIAGNOSIS — E03.9 HYPOTHYROIDISM, UNSPECIFIED TYPE: Primary | ICD-10-CM

## 2018-12-19 DIAGNOSIS — E06.9 THYROIDITIS: ICD-10-CM

## 2018-12-19 LAB
25(OH)D3+25(OH)D2 SERPL-MCNC: 27.4 NG/ML (ref 30–100)
ALBUMIN SERPL-MCNC: 4.4 G/DL (ref 3.5–5.2)
ALBUMIN/GLOB SERPL: 1.8 G/DL
ALP SERPL-CCNC: 41 U/L (ref 39–117)
ALT SERPL-CCNC: 7 U/L (ref 1–33)
AST SERPL-CCNC: 13 U/L (ref 1–32)
BILIRUB SERPL-MCNC: 0.6 MG/DL (ref 0.1–1.2)
BUN SERPL-MCNC: 10 MG/DL (ref 6–20)
BUN/CREAT SERPL: 13.2 (ref 7–25)
CALCIUM SERPL-MCNC: 9.7 MG/DL (ref 8.6–10.5)
CHLORIDE SERPL-SCNC: 105 MMOL/L (ref 98–107)
CO2 SERPL-SCNC: 27.3 MMOL/L (ref 22–29)
CREAT SERPL-MCNC: 0.76 MG/DL (ref 0.57–1)
GLOBULIN SER CALC-MCNC: 2.5 GM/DL
GLUCOSE SERPL-MCNC: 93 MG/DL (ref 65–99)
POTASSIUM SERPL-SCNC: 4 MMOL/L (ref 3.5–5.2)
PROT SERPL-MCNC: 6.9 G/DL (ref 6–8.5)
SODIUM SERPL-SCNC: 142 MMOL/L (ref 136–145)
T3 SERPL-MCNC: 88.1 NG/DL (ref 80–200)
T4 FREE SERPL-MCNC: 1.51 NG/DL (ref 0.93–1.7)
TSH SERPL DL<=0.005 MIU/L-ACNC: 4.11 MIU/ML (ref 0.27–4.2)

## 2018-12-20 ENCOUNTER — OFFICE VISIT (OUTPATIENT)
Dept: ENDOCRINOLOGY | Age: 23
End: 2018-12-20

## 2018-12-20 VITALS
BODY MASS INDEX: 21.45 KG/M2 | DIASTOLIC BLOOD PRESSURE: 64 MMHG | HEIGHT: 59 IN | HEART RATE: 78 BPM | SYSTOLIC BLOOD PRESSURE: 110 MMHG | WEIGHT: 106.4 LBS

## 2018-12-20 DIAGNOSIS — E06.9 THYROIDITIS: Primary | ICD-10-CM

## 2018-12-20 DIAGNOSIS — E03.9 HYPOTHYROIDISM, UNSPECIFIED TYPE: ICD-10-CM

## 2018-12-20 DIAGNOSIS — R53.82 CHRONIC FATIGUE: ICD-10-CM

## 2018-12-20 PROCEDURE — 99214 OFFICE O/P EST MOD 30 MIN: CPT | Performed by: INTERNAL MEDICINE

## 2018-12-20 RX ORDER — CLONAZEPAM 0.5 MG/1
TABLET ORAL
COMMUNITY
Start: 2018-12-17

## 2018-12-20 RX ORDER — ZIPRASIDONE HYDROCHLORIDE 40 MG/1
40 CAPSULE ORAL NIGHTLY
Refills: 2 | COMMUNITY
Start: 2018-11-27

## 2018-12-20 RX ORDER — FLUOXETINE HYDROCHLORIDE 20 MG/1
1 CAPSULE ORAL DAILY
COMMUNITY
Start: 2018-12-17 | End: 2019-11-06

## 2018-12-20 RX ORDER — TRAZODONE HYDROCHLORIDE 50 MG/1
TABLET ORAL
COMMUNITY
Start: 2018-12-17 | End: 2019-11-06

## 2018-12-20 NOTE — PROGRESS NOTES
22 y.o.    Patient Care Team:  Chidi Brand MD as PCP - General (Family Medicine)    Chief Complaint:      F/U HYPOTHYROIDISM.  HERE TO DISCUSS LAB RESULTS.  Subjective     HPI   Patient is a 23-year-old female of [place] origin came for follow-up of hypothyroidism/thyroiditis    Thyroiditis  Patient is briefly hypothyroid in the past and was given levothyroxine 25 µg  Patient did not tolerate the medication  She also tried Cytomel but had side effects  Again this medication was discontinued  Patient was diagnosed with bipolar illness  She has been taking medications Geodon Prozac and Depakote in the past  She still on birth control medication        Interval History    In the previous visit. Patient was placed on Cytomel for one month. Extent. She subsequently  discontinued. The medication. Patient reported that she did not make her feel any better.     She's currently taking Depakote from a psychiatrist.  She has not had any blood testing done recently.  Patient's weight is stable. At 110 pounds at a BMI of 23.1.  Patient started exercising on a regular basis.  She denies any cold or heat intolerance.      Interval History       In the previous visit patient was placed on Cytomel for 1 month only    patient managed to discontinue the medication  She reports that she does not feel any different not taking the medication.  She's been feeling much better for the past one month  She reported that she stopped olanzapine recently  She gained nearly 16 pounds since January  Since stopping the medication olanzapine she's trending down on her weight  Patient started exercising.  She's feeling better in general and is going back to work.  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  Patient is currently not on medication/replacement      Patient reports symptoms of fatigue and excessive daytime sleepiness, cold intolerance, hair loss, irregular menstrual cycles and 8 pound weight gain in the past 2 months      Other significant history includes diagnoses of bipolar or less in January 2017 when she apparently was in a manic episode for one week subsequently several medications were tried and patient is currently on olanzapine and Depakote.  She used to be on Lamictal which was stopped approximately 1 week ago due to severe anxiety  Patient denies any difficulty swallowing or pain in the neck.  She also denies any change in her voice  Patient is not previously known to have any thyroid problem        The following portions of the patient's history were reviewed and updated as appropriate: allergies, current medications, past family history, past medical history, past social history, past surgical history and problem list.    Past Medical History:   Diagnosis Date   • Hypothyroidism      Family History   Problem Relation Age of Onset   • Diabetes Mother      Social History     Socioeconomic History   • Marital status: Unknown     Spouse name: Not on file   • Number of children: Not on file   • Years of education: Not on file   • Highest education level: Not on file   Social Needs   • Financial resource strain: Not on file   • Food insecurity - worry: Not on file   • Food insecurity - inability: Not on file   • Transportation needs - medical: Not on file   • Transportation needs - non-medical: Not on file   Occupational History   • Not on file   Tobacco Use   • Smoking status: Never Smoker   • Smokeless tobacco: Never Used   Substance and  "Sexual Activity   • Alcohol use: No   • Drug use: No   • Sexual activity: Not on file   Other Topics Concern   • Not on file   Social History Narrative   • Not on file     No Known Allergies    Current Outpatient Medications:   •  divalproex (DEPAKOTE) 500 MG 24 hr tablet, take 2 tablets by mouth ONCE a day, Disp: , Rfl: 0  •  FLUoxetine (PROzac) 10 MG capsule, , Disp: , Rfl:   •  Levonorgestrel 13.5 MG intrauterine device IUD, 13.5 mg by Intrauterine route., Disp: , Rfl:   •  vitamin D (ERGOCALCIFEROL) 18862 units capsule capsule, Take 50,000 Units by mouth., Disp: , Rfl:   •  ziprasidone (GEODON) 20 MG capsule, take 1 capsule by mouth twice a day with food, Disp: , Rfl:         Review of Systems   Constitutional: Negative for chills, fatigue and fever.   Cardiovascular: Negative for chest pain and palpitations.   Gastrointestinal: Negative for abdominal pain, constipation, diarrhea, nausea and vomiting.   Endocrine: Negative for cold intolerance and heat intolerance.   All other systems reviewed and are negative.      Objective       Vitals:    12/20/18 1533   BP: 110/64   Pulse: 78   Weight: 48.3 kg (106 lb 6.4 oz)   Height: 149.9 cm (59\")     Body mass index is 21.49 kg/m².      Physical Exam   Constitutional: She is oriented to person, place, and time. She appears well-developed and well-nourished.   HENT:   Head: Normocephalic and atraumatic.   Eyes: EOM are normal. Pupils are equal, round, and reactive to light.   Neck: Normal range of motion. Neck supple.   Cardiovascular: Normal rate, regular rhythm, normal heart sounds and intact distal pulses.   Pulmonary/Chest: Effort normal and breath sounds normal.   Abdominal: Soft. Bowel sounds are normal. She exhibits no distension. There is no tenderness.   Musculoskeletal: Normal range of motion. She exhibits no edema.   Neurological: She is alert and oriented to person, place, and time. She has normal reflexes.   Skin: Skin is warm and dry. No rash noted. "   Psychiatric: She has a normal mood and affect. Her behavior is normal.   Nursing note and vitals reviewed.    Results Review:     I reviewed the patient's new clinical results.    Medical records reviewed  Summary:      Lab on 12/19/2018   Component Date Value Ref Range Status   • 25 Hydroxy, Vitamin D 12/19/2018 27.4* 30.0 - 100.0 ng/ml Final    Comment: Reference Range for Total Vitamin D 25(OH)  Deficiency    <20.0 ng/mL  Insufficiency 21-29 ng/mL  Sufficiency    ng/mL  Toxicity      >100 ng/ml        • T3, Total 12/19/2018 88.1  80.0 - 200.0 ng/dl Final   • TSH 12/19/2018 4.110  0.270 - 4.200 mIU/mL Final   • Free T4 12/19/2018 1.51  0.93 - 1.70 ng/dL Final   • Glucose 12/19/2018 93  65 - 99 mg/dL Final   • BUN 12/19/2018 10  6 - 20 mg/dL Final   • Creatinine 12/19/2018 0.76  0.57 - 1.00 mg/dL Final   • eGFR Non  Am 12/19/2018 93  >60 mL/min/1.73 Final   • eGFR African Am 12/19/2018 113  >60 mL/min/1.73 Final   • BUN/Creatinine Ratio 12/19/2018 13.2  7.0 - 25.0 Final   • Sodium 12/19/2018 142  136 - 145 mmol/L Final   • Potassium 12/19/2018 4.0  3.5 - 5.2 mmol/L Final   • Chloride 12/19/2018 105  98 - 107 mmol/L Final   • Total CO2 12/19/2018 27.3  22.0 - 29.0 mmol/L Final   • Calcium 12/19/2018 9.7  8.6 - 10.5 mg/dL Final   • Total Protein 12/19/2018 6.9  6.0 - 8.5 g/dL Final   • Albumin 12/19/2018 4.40  3.50 - 5.20 g/dL Final   • Globulin 12/19/2018 2.5  gm/dL Final   • A/G Ratio 12/19/2018 1.8  g/dL Final   • Total Bilirubin 12/19/2018 0.6  0.1 - 1.2 mg/dL Final   • Alkaline Phosphatase 12/19/2018 41  39 - 117 U/L Final   • AST (SGOT) 12/19/2018 13  1 - 32 U/L Final   • ALT (SGPT) 12/19/2018 7  1 - 33 U/L Final     No results found for: HGBA1C  Lab Results   Component Value Date    CREATININE 0.76 12/19/2018     Imaging Results (most recent)     None          Assessment and Plan:    Patricio was seen today for hypothyroidism.    Diagnoses and all orders for this visit:    Thyroiditis    Chronic  "fatigue    Hypothyroidism, unspecified type       Patient reported that she's been feeling better  She is away in [place] for studies abroad  She is managing well  She denied any symptoms suggestive of hyper or hypothyroidism  She continues to experience tenderness over the thyroid which was antibody negative in the past    Fatigue is also improved slightly  Patient is vitamin D deficient  I advised her to take 5000 units a vitamin D3 daily    I did not plan any specific follow-up at this time  When the [place] is back [place] she may get back to us for lab testing and then follow-up if necessary  Patient verbalized understanding    The total time spent  was more than 25 min of which greater than 15 min of time (greater than 50% of the total time)  was spent face to face with the patient counseling and coordination of care on recommended evaluation and treatment options, instructions for management/treatment and /or follow up and importance of compliance with chosen management or treatment options    You Pulliam MD. FACE    12/20/18      EMR Dragon / transcription disclaimer:     \"Dictated utilizing Dragon dictation\".         "

## 2019-11-06 ENCOUNTER — OFFICE VISIT (OUTPATIENT)
Dept: ENDOCRINOLOGY | Age: 24
End: 2019-11-06

## 2019-11-06 VITALS
DIASTOLIC BLOOD PRESSURE: 60 MMHG | HEIGHT: 59 IN | BODY MASS INDEX: 24.8 KG/M2 | WEIGHT: 123 LBS | SYSTOLIC BLOOD PRESSURE: 118 MMHG | HEART RATE: 83 BPM

## 2019-11-06 DIAGNOSIS — E03.9 HYPOTHYROIDISM, UNSPECIFIED TYPE: Primary | ICD-10-CM

## 2019-11-06 DIAGNOSIS — R53.82 CHRONIC FATIGUE: ICD-10-CM

## 2019-11-06 DIAGNOSIS — E06.9 THYROIDITIS: ICD-10-CM

## 2019-11-06 PROCEDURE — 99214 OFFICE O/P EST MOD 30 MIN: CPT | Performed by: INTERNAL MEDICINE

## 2019-11-06 RX ORDER — LEVOTHYROXINE SODIUM 0.03 MG/1
25 TABLET ORAL DAILY
COMMUNITY
Start: 2019-09-27 | End: 2019-12-08 | Stop reason: SDUPTHER

## 2019-11-07 LAB
T4 FREE SERPL-MCNC: 1.46 NG/DL (ref 0.93–1.7)
TSH SERPL DL<=0.005 MIU/L-ACNC: 3.66 UIU/ML (ref 0.27–4.2)

## 2019-12-08 RX ORDER — LEVOTHYROXINE SODIUM 0.03 MG/1
25 TABLET ORAL DAILY
Qty: 30 TABLET | Refills: 5 | Status: SHIPPED | OUTPATIENT
Start: 2019-12-08 | End: 2019-12-20 | Stop reason: SDUPTHER

## 2019-12-20 RX ORDER — LEVOTHYROXINE SODIUM 0.03 MG/1
25 TABLET ORAL DAILY
Qty: 30 TABLET | Refills: 5 | Status: SHIPPED | OUTPATIENT
Start: 2019-12-20 | End: 2019-12-20 | Stop reason: SDUPTHER

## 2019-12-20 RX ORDER — LEVOTHYROXINE SODIUM 0.03 MG/1
25 TABLET ORAL DAILY
Qty: 90 TABLET | Refills: 1 | Status: SHIPPED | OUTPATIENT
Start: 2019-12-20

## 2021-04-16 ENCOUNTER — BULK ORDERING (OUTPATIENT)
Dept: CASE MANAGEMENT | Facility: OTHER | Age: 26
End: 2021-04-16

## 2021-04-16 DIAGNOSIS — Z23 IMMUNIZATION DUE: ICD-10-CM

## 2025-07-01 ENCOUNTER — OFFICE VISIT (OUTPATIENT)
Dept: OBSTETRICS AND GYNECOLOGY | Facility: CLINIC | Age: 30
End: 2025-07-01
Payer: COMMERCIAL

## 2025-07-01 VITALS — DIASTOLIC BLOOD PRESSURE: 76 MMHG | WEIGHT: 185.6 LBS | BODY MASS INDEX: 37.49 KG/M2 | SYSTOLIC BLOOD PRESSURE: 142 MMHG

## 2025-07-01 DIAGNOSIS — Z11.51 SCREENING FOR HUMAN PAPILLOMAVIRUS: ICD-10-CM

## 2025-07-01 DIAGNOSIS — Z01.419 CERVICAL SMEAR, AS PART OF ROUTINE GYNECOLOGICAL EXAMINATION: ICD-10-CM

## 2025-07-01 DIAGNOSIS — Z01.419 WOMEN'S ANNUAL ROUTINE GYNECOLOGICAL EXAMINATION: Primary | ICD-10-CM

## 2025-07-01 DIAGNOSIS — L68.0 FEMALE HIRSUTISM: ICD-10-CM

## 2025-07-01 DIAGNOSIS — Z30.09 FAMILY PLANNING: ICD-10-CM

## 2025-07-01 DIAGNOSIS — Z11.3 SCREENING EXAMINATION FOR STI: ICD-10-CM

## 2025-07-01 LAB
B-HCG UR QL: NEGATIVE
BILIRUB BLD-MCNC: NEGATIVE MG/DL
CLARITY, POC: CLEAR
COLOR UR: YELLOW
EXPIRATION DATE: NORMAL
GLUCOSE UR STRIP-MCNC: NEGATIVE MG/DL
INTERNAL NEGATIVE CONTROL: NORMAL
INTERNAL POSITIVE CONTROL: NORMAL
KETONES UR QL: NEGATIVE
LEUKOCYTE EST, POC: NEGATIVE
Lab: NORMAL
NITRITE UR-MCNC: NEGATIVE MG/ML
PH UR: 5 [PH] (ref 5–8)
PROT UR STRIP-MCNC: NEGATIVE MG/DL
RBC # UR STRIP: NEGATIVE /UL
SP GR UR: 1 (ref 1–1.03)
UROBILINOGEN UR QL: NORMAL

## 2025-07-01 RX ORDER — LEVOTHYROXINE SODIUM 88 UG/1
TABLET ORAL
COMMUNITY
Start: 2025-04-10

## 2025-07-01 RX ORDER — LITHIUM CARBONATE 300 MG/1
1800 CAPSULE ORAL
COMMUNITY
Start: 2025-05-15

## 2025-07-01 RX ORDER — LANOLIN ALCOHOL/MO/W.PET/CERES
100 CREAM (GRAM) TOPICAL DAILY
COMMUNITY
Start: 2025-04-02 | End: 2025-07-12

## 2025-07-01 RX ORDER — RISPERIDONE 3 MG/1
3 TABLET ORAL NIGHTLY
COMMUNITY
Start: 2025-05-09 | End: 2025-07-02

## 2025-07-01 RX ORDER — LORAZEPAM 1 MG/1
TABLET ORAL
COMMUNITY
Start: 2025-05-29

## 2025-07-01 RX ORDER — QUETIAPINE 200 MG/1
200 TABLET, FILM COATED, EXTENDED RELEASE ORAL
COMMUNITY

## 2025-07-01 RX ORDER — FOLIC ACID 1 MG/1
1 TABLET ORAL DAILY
COMMUNITY
Start: 2025-04-02 | End: 2025-07-12

## 2025-07-01 RX ORDER — METFORMIN HYDROCHLORIDE 500 MG/1
500 TABLET, EXTENDED RELEASE ORAL DAILY
COMMUNITY
Start: 2025-06-06

## 2025-07-01 RX ORDER — MULTIVIT-MIN/FERROUS SULFATE 4.5 MG
TABLET ORAL
COMMUNITY
Start: 2025-05-03

## 2025-07-01 NOTE — PROGRESS NOTES
"Chief Complaint  FAMILY PLANNING  Annual exam    Subjective        Patricio Baker presents to [facility]  For consult regarding family-planning.  She is  and while she is not trying for children at this time does have desire for them in the future.  Was told at one point time she may have PCOS and has been experiencing some increased signs of hirsutism with abnormal hair growth.  Has had a lack of period for multiple months; had two progesterone IUDs over a 10 year period with removal last May.  Initially had regular menses though again has not had a period in multiple months. Thinks that the addition of numerous new mood medications may be causing her to have her amenorrhea. No breast related symptoms at this time. ASCUS HPV negative 3 years ago and describes having a colposcopy in the past.     Objective   Vital Signs:  /76   Wt 84.2 kg (185 lb 9.6 oz)   BMI 37.49 kg/m²   Estimated body mass index is 37.49 kg/m² as calculated from the following:    Height as of 11/6/19: 149.9 cm (59\").    Weight as of this encounter: 84.2 kg (185 lb 9.6 oz).    Physical Exam  Vitals reviewed. Exam conducted with a chaperone present.   Constitutional:       General: She is not in acute distress.     Appearance: Normal appearance. She is not ill-appearing.   Eyes:      Pupils: Pupils are equal, round, and reactive to light.   Cardiovascular:      Rate and Rhythm: Normal rate.   Pulmonary:      Effort: Pulmonary effort is normal. No respiratory distress.   Abdominal:      General: Abdomen is flat. There is no distension.   Genitourinary:     Comments: Normal external appearing genitalia without lesions or erythema.   Normal appearing vaginal mucosa with scant amount of physiologic appearing discharge.   Normal appearing cervix without lesions or friability.   Neurological:      General: No focal deficit present.      Mental Status: She is alert.   Psychiatric:         Mood and Affect: Mood " normal.         Thought Content: Thought content normal.        Result Review :           Assessment and Plan   Diagnoses and all orders for this visit:    1. Women's annual routine gynecological examination (Primary)  -     IGP, Apt HPV,rfx 16 / 18,45  -     NuSwab VG+ - Swab, Vagina  -     Genital Mycoplasmas ALEXANDRO, Swab - Swab, Vagina    2. Family planning  -     POC Urinalysis Dipstick, Multipro  -     POC Pregnancy, Urine  -     Antimullerian Hormone (AMH)    3. Female hirsutism  -     17-Hydroxyprogesterone  -     DHEA-Sulfate  -     Estradiol, Free Serum  -     FSH & LH  -     Insulin, Total  -     Testosterone, Free, Total  -     Hemoglobin A1c    4. Cervical smear, as part of routine gynecological examination  -     IGP, Apt HPV,rfx 16 / 18,45    5. Screening for human papillomavirus  -     IGP, Apt HPV,rfx 16 / 18,45    6. Screening examination for STI  -     NuSwab VG+ - Swab, Vagina  -     Genital Mycoplasmas ALEXANDRO, Swab - Swab, Vagina        Discussed workup for PCOS and [place] criteria to rule in, which she does based on ovulation dysfunction and hirsutism.  Will defer transvaginal ultrasound at this time but complete lab workup for hirsutism.    Interested in weight loss and was on semaglutide in the past but had severe nausea and vomiting that led to inability to keep down her mood medications and subsequent mood episode.  Plan to stay on metformin at this time and increase diet and lifestyle changes.    Discussed available options for assisted reproductive technology including ovulation induction with medications like letrozole and Clomid as well as IVF.  Will complete an antimullerian hormone today to assess potential response to ovulation induction medication(s). Encouraged prenatal vitamins daily.  I spent 15 minutes on the separately reported service of family planning and PCOS. This time is not included in the time used to support the E/M service also reported today.           Follow Up   Return  in about 1 year (around 7/1/2026), or if symptoms worsen or fail to improve, for Annual.  Patient was given instructions and counseling regarding her condition or for health maintenance advice. Please see specific information pulled into the AVS if appropriate.     Ever Lopez MD  7/8/2025   15:04 EDT

## 2025-07-02 ENCOUNTER — PATIENT ROUNDING (BHMG ONLY) (OUTPATIENT)
Dept: OBSTETRICS AND GYNECOLOGY | Facility: CLINIC | Age: 30
End: 2025-07-02
Payer: COMMERCIAL

## 2025-07-02 NOTE — PROGRESS NOTES
A MY-CHART MESSAGE HAS BEEN SENT TO THE PATIENT FOR [place] [facility]

## 2025-07-03 LAB
A VAGINAE DNA VAG QL NAA+PROBE: NORMAL SCORE
BVAB2 DNA VAG QL NAA+PROBE: NORMAL SCORE
C ALBICANS DNA VAG QL NAA+PROBE: NEGATIVE
C GLABRATA DNA VAG QL NAA+PROBE: NEGATIVE
C TRACH DNA SPEC QL NAA+PROBE: NEGATIVE
M GENITALIUM DNA SPEC QL NAA+PROBE: NEGATIVE
M HOMINIS DNA SPEC QL NAA+PROBE: NEGATIVE
MEGA1 DNA VAG QL NAA+PROBE: NORMAL SCORE
N GONORRHOEA DNA VAG QL NAA+PROBE: NEGATIVE
T VAGINALIS DNA VAG QL NAA+PROBE: NEGATIVE
UREAPLASMA DNA SPEC QL NAA+PROBE: POSITIVE

## 2025-07-07 LAB
CYTOLOGIST CVX/VAG CYTO: NORMAL
CYTOLOGY CVX/VAG DOC CYTO: NORMAL
CYTOLOGY CVX/VAG DOC THIN PREP: NORMAL
DX ICD CODE: NORMAL
HPV I/H RISK 4 DNA CVX QL PROBE+SIG AMP: NEGATIVE
OTHER STN SPEC: NORMAL
SERVICE CMNT-IMP: NORMAL
STAT OF ADQ CVX/VAG CYTO-IMP: NORMAL

## 2025-07-08 LAB
17OHP SERPL-MCNC: 35 NG/DL
DHEA-S SERPL-MCNC: 506 UG/DL (ref 84.8–378)
ESTRADIOL FREE MFR SERPL: 2.2 %
ESTRADIOL FREE SERPL-MCNC: 0.64 PG/ML
ESTRADIOL SERPL HS-MCNC: 29 PG/ML
FSH SERPL-ACNC: 8.2 MIU/ML
HBA1C MFR BLD: 5.4 % (ref 4.8–5.6)
INSULIN SERPL-ACNC: 130 UIU/ML (ref 2.6–24.9)
LH SERPL-ACNC: 19 MIU/ML
MIS SERPL-MCNC: 4.87 NG/ML
TESTOST FREE SERPL-MCNC: 3.1 PG/ML (ref 0–4.2)
TESTOST SERPL-MCNC: 57 NG/DL (ref 8–60)